# Patient Record
Sex: MALE | Race: WHITE
[De-identification: names, ages, dates, MRNs, and addresses within clinical notes are randomized per-mention and may not be internally consistent; named-entity substitution may affect disease eponyms.]

---

## 2019-06-21 ENCOUNTER — HOSPITAL ENCOUNTER (EMERGENCY)
Dept: HOSPITAL 75 - ER | Age: 24
Discharge: HOME | End: 2019-06-21
Payer: SELF-PAY

## 2019-06-21 VITALS — SYSTOLIC BLOOD PRESSURE: 131 MMHG | DIASTOLIC BLOOD PRESSURE: 92 MMHG

## 2019-06-21 VITALS — BODY MASS INDEX: 24.38 KG/M2 | HEIGHT: 72 IN | WEIGHT: 180 LBS

## 2019-06-21 DIAGNOSIS — F17.210: ICD-10-CM

## 2019-06-21 DIAGNOSIS — S91.212A: Primary | ICD-10-CM

## 2019-06-21 DIAGNOSIS — Z79.52: ICD-10-CM
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
## 2019-06-21 PROCEDURE — 64450 NJX AA&/STRD OTHER PN/BRANCH: CPT

## 2019-06-21 PROCEDURE — 12002 RPR S/N/AX/GEN/TRNK2.6-7.5CM: CPT

## 2019-06-21 NOTE — XMS REPORT
[facility]

                             Created on: 11/10/2017



Jose Alvarez

External Reference #: [id number]

: 1995

Sex: Male



Demographics







                          [place]  [address]

 

                          Preferred Language        Unknown

 

                          Marital Status            Unknown

 

                          Latter-day Affiliation     Unknown

 

                          Race                      Unknown

 

                          Ethnic Group              Unknown





Author







                          Author                    MATTEO GALEAS

 

                          [facility]

 

                          Address                   Unknown

 

                          Phone                     [phone]







Care Team Providers







                    Care Team Member Name    Role                Phone

 

                    MATTEO GALESA     Unavailable         [phone]







PROBLEMS

Unknown Problems



ALLERGIES

No Known Allergies



SOCIAL HISTORY

Never Assessed



PLAN OF CARE







                          Activity                  Details









                                         









                          Follow Up                 prn Reason:prn







VITAL SIGNS







                    Height              71 in               2017-05-10

 

                    Blood pressure systolic    151 mmHg            2017-05-10

 

                    Blood pressure diastolic    93 mmHg             2017-05-10







MEDICATIONS

No Known Medications



RESULTS

No Results



PROCEDURES







                Procedure       Date Ordered    Result          Body Site

 

                LTD ORAL EVALUATION - PROBLEM FOCUS    May 10, 2017                     

 

                INTRAORL-PERIAPICAL 1 FILM 94287    May 10, 2017                     

 

                INTRAORL-PERIAPICAL EA ADD FILM    May 10, 2017                     







IMMUNIZATIONS

No Known Immunizations



MEDICAL (GENERAL) HISTORY







                    Type                Description         Date

 

                    Surgical History    main artery and tendon repair Rt wrist     

 

                    Surgical History    tonsillectomy and adenoidectomy     

 

                    Hospitalization History    surgeries

## 2019-06-21 NOTE — XMS REPORT
Patient Summary (HL7 CCD)

                             Created on: 2016



TALIB METZ

External Reference #: [id number]

: 1995

Sex: Male



Demographics







                          Address                   [address] [place]  [address]

 

                          Home Phone                [phone]

 

                          Preferred Language        English

 

                          Marital Status            Unknown

 

                          Adventism Affiliation     Unknown

 

                          Race                      White

 

                          Ethnic Group              Not  or 





Author







                          JONNY Bennett              Unknown

 

                          Address                   [address]

[place], [place]  [id number]



 

                          Phone                     [phone]







Care Team Providers







                    Care Team Member Name    Role                Phone

 

                    ELIJAH REDD MD    Attphys             [phone]

 

                    ELIJAH REDD MD    Prisurg             [phone]



                                            



Vital Signs

          Unknown or Not Available.                                             
                      



Allergies

          





             Allergy          Code          Allergy Type          Reaction          Status    

 

             No Known Drug Allergies          0            No known drug allergies                       

Active    



                                                                              



Procedures

          Unknown or Not Available.                                             
                                



History of Immunizations

          





                    Immunization          Code                Date    

 

                    MMR                 1997    

 

                    MMR                 2001    

 

                    Hep B, adolescent or pediatric          1996    

 

                    Hep B, adolescent or pediatric          1996    

 

                    Hep B, adolescent or pediatric          08                  1997    

 

                    IPV                 10                  1996    

 

                    IPV                 10                  1996    

 

                    IPV                 10                  1997    

 

                    IPV                 10                  2001    

 

                    influenza, split (incl. purified surface antigen)          15                  2009   

 

 

                    Hib, unspecified formulation          17                  1996    

 

                    DTaP                20                  1996    

 

                    DTaP                20                  1996    

 

                    DTaP                20                  1997    

 

                    DTaP                20                  1997    

 

                    DTaP                20                  2001    

 

                    varicella           21                  1997    

 

                    varicella           21                  2007    

 

                    varicella           21                  2009    

 

                    HPV, quadrivalent          62                  2014    

 

                    Hep A, ped/adol, 2 dose          83                  2007    

 

                    Hep A, ped/adol, 2 dose          83                  2008    

 

                    Hep A, ped/adol, 2 dose          83                  2009    

 

                    Hep A, ped/adol, 2 dose          83                  2011    

 

                    influenza, live, intranasal          111                 2008    

 

                    meningococcal MCV4P          114                 2008    

 

                    meningococcal MCV4P          114                 2009    

 

                    meningococcal MCV4P          114                 2009    

 

                    Tdap                115                 2008    

 

                    Tdap                115                 2009    

 

                    Tdap                115                 2011    

 

                    Influenza, seasonal, injectable, preservative free          140                 2013 

   



                                                                                
                                                                                
                                                                                
                                                                                
                                                                



Problems

          





             Problem          Code          Start Date          Resolved Date          Status    

 

             Lacerated tendon          [id number]          2015                       Active    



                                                                                
       



Results

          Unknown or Not Available.                                             
                                          



Active Medications

          Unknown or Not Available.                                             
                      



Medications Administered During Visit

          Unknown or Not Available.                                             
                                



Encounters

          





                    Encounter Diagnosis          Diagnosis Code          Start Date    

 

                    Abrasion of hand           [id number]           2016    



                                                                    



Social History

          





                Smoking Status          Code            Start Date          End Date    

 

                Current some day smoker          [id number]                               



                                                                    



Patient Decision Aids

          Unknown or Not Available.                                             
            



Discharge Instructions

          







                                                



You were admitted to        [facility] on 2016 02:44 with a principal 
diagnosis of Abrasion of right hand, initial encounter        



You were discharged from        [facility] on 2016 03:19        



Should you have any questions prior to discharge, please contact a member of 
your healthcare team. If you have left the hospital and have any questions, 
please contact your primary care physician.          



                                                          



Chief Complaint and Reason For Visit

          





                          Chief Complaint           Date of Onset    

 

                          WRIST PAIN                     



                                                          



Function Status

          





             Description          Code          Date          Type          Status    

 

             No Impairments          [id number]          10/12/2015          Functional          Active

    



                                                                    



Plan of Care

          





                                        Care Plan Entries    

 

                                        Problem: Lacerated tendon [SNOMED: 711306352]    

 

                                        Goal: Pain control management  [SNOMED: 160189138]    

 

                                        Instructions: take prn pain meds as needed, and prescribed by physician.    







                                                                              



Referral/Transition of Care

          Unknown or Not Available.

## 2019-06-21 NOTE — XMS REPORT
[facility]

                             Created on: 2016



Jose Alvarez

External Reference #: [id number]

: 1995

Sex: Male



Demographics







                          Address                   [place]  [address]

 

                          Home Phone                [phone]

 

                          Preferred Language        Unknown

 

                          Marital Status            Unknown

 

                          Christianity Affiliation     Unknown

 

                          Race                      White

 

                          Ethnic Group              Not  or 





Author







                          Author                    MOIRA FULTON

 

                          Organization              [employer]

 

                          Address                   Unknown

 

                          Phone                     Unavailable







Care Team Providers







                    Care Team Member Name    Role                Phone

 

                    MOIRA FULTON      CP                  Unavailable



                                                                



Allergies

          No Known Allergies                                                    
                                   



Problems

          





             Problem Type    Condition    Code         Onset Dates    Condition Status

 

             Assessment    Groin abscess    L02.214                   Active

 

             Assessment    Rash         R21                       Active

 

             Assessment    Rhinitis, allergic    J30.9                     Active



                                                                                
                           



Medications

          





        Medication    Code System    Code    Instructions    Start Date    End Date    Status    Dosage



 

           Hydrocortisone    NDC        [address]-81    1 % Externally Twice a day    2016    

                                                    1 application to affected area

 

           Claritin    NDC        [address]-92    10 MG Orally Once a day    2016    May 26, 2016

                                                    1 tablet

 

             Clindamycin HCl    NDC          [address]-01    150 MG Orally 4 times a day    2016

                    May 06, 2016                            2 capsules



                                                                                
                           



Procedures

          





                Procedure       Coding System    Code            Date

 

                Office Visit, Est Pt., Level 2    CPT-4           98169           2016



                                                                                
                 



Vital Signs

          





                          Date/Time:                2016

 

                          Cardiac Monitoring Heart Rate    68 bpm

 

                          Weight                    174 lbs

 

                          Height                    71 in

 

                          BMI                       24.27 Index

 

                          Blood Pressure Diastolic    68 mmHg

 

                          Blood Pressure Systolic    110 mmHg



                                                                              



Results

          No Known Results                                                      
             



Summary Purpose

          [employer] Submission

## 2019-06-21 NOTE — XMS REPORT
Patient Summary (HL7 CCD)

                             Created on: 2016



TALIB METZ

External Reference #: [id number]

: 1995

Sex: Male



Demographics







                          Address                   [address] [place]  [address]

 

                          Home Phone                [phone]

 

                          Preferred Language        English

 

                          Marital Status            Unknown

 

                          Alevism Affiliation     Unknown

 

                          Race                      White

 

                          Ethnic Group              Not  or 





Author







                          JONNY Bennett              Unknown

 

                          Address                   [address]

[place], [place]  [address]



 

                          Phone                     [phone]







Care Team Providers







                    Care Team Member Name    Role                Phone

 

                    ELIJAH REDD MD    Attphys             [phone]

 

                    ELIJAH REDD MD    Prisurg             [phone]



                                            



Vital Signs

          Unknown or Not Available.                                             
                      



Allergies

          





             Allergy          Code          Allergy Type          Reaction          Status    

 

             No Known Drug Allergies          0            No known drug allergies                       

Active    



                                                                              



Procedures

          Unknown or Not Available.                                             
                                



History of Immunizations

          





                    Immunization          Code                Date    

 

                    MMR                 1997    

 

                    MMR                 2001    

 

                    Hep B, adolescent or pediatric          1996    

 

                    Hep B, adolescent or pediatric          1996    

 

                    Hep B, adolescent or pediatric          08                  1997    

 

                    IPV                 10                  1996    

 

                    IPV                 10                  1996    

 

                    IPV                 10                  1997    

 

                    IPV                 10                  2001    

 

                    influenza, split (incl. purified surface antigen)          15                  2009   

 

 

                    Hib, unspecified formulation          17                  1996    

 

                    DTaP                20                  1996    

 

                    DTaP                20                  1996    

 

                    DTaP                20                  1997    

 

                    DTaP                20                  1997    

 

                    DTaP                20                  2001    

 

                    varicella           21                  1997    

 

                    varicella           21                  2007    

 

                    varicella           21                  2009    

 

                    HPV, quadrivalent          62                  2014    

 

                    Hep A, ped/adol, 2 dose          83                  2007    

 

                    Hep A, ped/adol, 2 dose          83                  2008    

 

                    Hep A, ped/adol, 2 dose          83                  2009    

 

                    Hep A, ped/adol, 2 dose          83                  2011    

 

                    influenza, live, intranasal          111                 2008    

 

                    meningococcal MCV4P          114                 2008    

 

                    meningococcal MCV4P          114                 2009    

 

                    meningococcal MCV4P          114                 2009    

 

                    Tdap                115                 2008    

 

                    Tdap                115                 2009    

 

                    Tdap                115                 2011    

 

                    Influenza, seasonal, injectable, preservative free          140                 2013 

   



                                                                                
                                                                                
                                                                                
                                                                                
                                                                



Problems

          





             Problem          Code          Start Date          Resolved Date          Status    

 

             Lacerated tendon          [id number]          2015                       Active    



                                                                                
       



Results

          Unknown or Not Available.                                             
                                          



Active Medications

          Unknown or Not Available.                                             
                      



Medications Administered During Visit

          Unknown or Not Available.                                             
                                



Encounters

          





                    Encounter Diagnosis          Diagnosis Code          Start Date    

 

                    Stimulant abuse           984926494           2016    



                                                                    



Social History

          





                Smoking Status          Code            Start Date          End Date    

 

                Current some day smoker          [id number]                               



                                                                    



Patient Decision Aids

          Unknown or Not Available.                                             
            



Discharge Instructions

          







                                                



You were admitted to        [facility] on 2016 03:34 with a principal 
diagnosis of Other stimulant abuse, uncomplicated        



You were discharged from        [facility] on 2016 04:01        



Should you have any questions prior to discharge, please contact a member of 
your healthcare team. If you have left the hospital and have any questions, 
please contact your primary care physician.          



                                                          



Chief Complaint and Reason For Visit

          





                          Chief Complaint           Date of Onset    

 

                          SWALLOWED DRUGS                



                                                          



Function Status

          





             Description          Code          Date          Type          Status    

 

             No Impairments          [id number]          10/12/2015          Functional          Active

    



                                                                    



Referral/Transition of Care

          Unknown or Not Available.

## 2019-06-21 NOTE — ED INTEGUMENTARY GENERAL
General


Chief Complaint:  Skin/Wound Problems


Stated Complaint:  LEFT BIG TOE LAC


Source:  patient, other


Exam Limitations:  no limitations





History of Present Illness


Date Seen by Provider:  Jun 21, 2019


Time Seen by Provider:  01:57


Initial Comments


Patient presents to ER by private conveyance with his significant other chief 

complaint of just prior to arrival he tripped and fell stubbing his toe into a 

mirror which broke and caused a laceration of the end of his great toe of his 

left foot. He wrapped it up and a bandanna. He is up-to-date on his tetanus shot

in the last couple years. Pain is not significant at this time. Does not take 

any medicines nor have any significant medical history.





Allergies and Home Medications


Allergies


Coded Allergies:  


     No Known Drug Allergies (Unverified , 6/24/12)





Home Medications


Albuterol 17 Gm Inh, 1-2 PUFF IH Q4H PRN


   Prescribed by: RAEGAN MI on 6/25/12 0019


Aripiprazole 10 Mg Tab, 1 TAB PO BID, (Reported)


   DAILY 


Loratadine 10 Mg Tablet, 10 MG PO DAILY, (Reported)


Prednisone 20 Mg Tab, 20 MG PO BID


   Prescribed by: RAEGAN MI on 6/25/12 0019


Risperidone 0.5 Mg Tablet, 0.5 MG PO HS, (Reported)





Patient Home Medication List


Home Medication List Reviewed:  Yes





Review of Systems


Review of Systems


Constitutional:  No chills, No fever


EENTM:  No ear discharge, No hearing loss, No blurred vision


Respiratory:  No cough, No phlegm, No short of breath


Cardiovascular:  No chest pain, No edema


Gastrointestinal:  No abdominal pain, No constipation, No nausea





Past Medical-Social-Family Hx


Patient Social History


Smoking Status:  Current Everyday Smoker


Type Used:  Cigarettes


Recent Foreign Travel:  No


Contact w/Someone Who Travel:  No





Physical Exam


Vital Signs


Capillary Refill :


General Appearance:  WD/WN, mild distress


Neck:  full range of motion, normal inspection


Cardiovascular:  normal peripheral pulses, regular rate, rhythm


Respiratory:  no respiratory distress, no accessory muscle use


Neurologic/Psychiatric:  no motor/sensory deficits, alert, normal mood/affect, 

oriented x 3


Skin:  other (3.5 cm linear laceration left foot great toe)





Procedures/Interventions





   Wound Location:  Lower Extremities


Other Wound Location


Left foot great toe anterior distal phalangeal


   Wound Length (cm):  3.5


   Wound's Depth, Shape:  linear, sub Q


   Wound Explored:  no foreign body removed


   Irrigated w/ Saline (ccs):  100


   Betadine Prep?:  Yes (and chlorhexidine sterile saline)


   Anesthesia:  1% Lidocaine


   Volume Anesthetic (ccs):  4


   Wound Debrided:  minimal


   Suture:  Prolene


   Suture Size:  4-0


   Number of Sutures:  6


   Sterile Dressing Applied?:  Yes


Progress


Patient's wound was cleaned with chlorhexidine and sterile saline and then 

soaked in Betadine. We then did a digital block of the great toe left foot in 

the usual fashion using 2 cc of 1% lidocaine without epinephrine bilaterally. 

When his toe was ascertained to be numb we did a very thorough job of cleaning 

out the wound with the chlorhexidine and sterile saline and then soaked again in

Betadine. Using the usual sterile fashion way reapproximated the skin edges 

using 6 simple interrupted sutures made of 4-0 Prolene. The patient tolerated 

the procedure very well. Triple antibiotic ointment was applied over the sutures

and then gauze.





Progress/Results/Core Measures


Results/Orders


My Orders





Orders - PARAS SHORT


Lidocaine 1% Inj 20 Ml (Xylocaine 1% Inj (6/21/19 02:15)








Departure


Impression





   Primary Impression:  


   Laceration of left great toe w/o foreign body w/o damage to nail


   Qualified Codes:  S91.112A - Laceration without foreign body of left great 

   toe without damage to nail, initial encounter


Disposition:  01 HOME, SELF-CARE


Condition:  Improved





Departure-Patient Inst.


Decision time for Depature:  02:35


Referrals:  


NO,LOCAL PHYSICIAN (PCP/Family)


Primary Care Physician


Patient Instructions:  Laceration Repair With Stitches (DC)





Add. Discharge Instructions:  


Keep the wound clean with regular soap and water only. You can apply Vaseline 

over the sutures and then some gauze dressing to be changed every time it 

becomes soiled or at least daily. 


Return to the ER or your primary care doctor in 10 days to have the sutures 

removed. 


Take the Bactrim one tablet twice a day with food for the next 5 days to prevent

infection. 


If you start to have fevers, chills, vomiting or other worrisome symptoms then 

return to the ER. 


If you have increased swelling or pain use Tylenol 1000 mg every 8 hours in 

addition to ibuprofen 800 mg every 8 hours and elevate the foot above the level 

of your heart. 


All discharge instructions reviewed with patient and/or family. Voiced 

understanding.


Scripts


Sulfamethoxazole/Trimethoprim (Bactrim Ds Tablet) 1 Each Tablet


1 EACH PO BID WITH MEALS for 5 Days, #10 TAB 0 Refills


   Prov: PARAS SHORT         6/21/19











PARAS SHORT                 Jun 21, 2019 02:12

## 2019-06-21 NOTE — XMS REPORT
Patient Summary (HL7 CCD)

                             Created on: 2016



TALIB METZ

External Reference #: [id number]

: 1995

Sex: Male



Demographics







                          Address                   [address] [place]  [address]

 

                          Home Phone                [phone]

 

                          Preferred Language        English

 

                          Marital Status            Unknown

 

                          Hinduism Affiliation     Unknown

 

                          Race                      White

 

                          Ethnic Group              Not  or 





Author







                          JONNY Bennett              Unknown

 

                          Address                   [address] [facility] [address] [facility] [address]

[place], [place]  [id number]



 

                          Phone                     [phone]







Care Team Providers







                    Care Team Member Name    Role                Phone

 

                    ELIJAH REDD MD    Attphys             [phone]

 

                    ELIJAH REDD MD    Prisurg             [phone]



                                            



Vital Signs

          Unknown or Not Available.                                             
                      



Allergies

          





             Allergy          Code          Allergy Type          Reaction          Status    

 

             No Known Drug Allergies          0            No known drug allergies                       

Active    



                                                                              



Procedures

          Unknown or Not Available.                                             
                                



History of Immunizations

          





                    Immunization          Code                Date    

 

                    MMR                 1997    

 

                    MMR                 2001    

 

                    Hep B, adolescent or pediatric          1996    

 

                    Hep B, adolescent or pediatric          1996    

 

                    Hep B, adolescent or pediatric          08                  1997    

 

                    IPV                 10                  1996    

 

                    IPV                 10                  1996    

 

                    IPV                 10                  1997    

 

                    IPV                 10                  2001    

 

                    influenza, split (incl. purified surface antigen)          15                  2009   

 

 

                    Hib, unspecified formulation          17                  1996    

 

                    DTaP                20                  1996    

 

                    DTaP                20                  1996    

 

                    DTaP                20                  1997    

 

                    DTaP                20                  1997    

 

                    DTaP                20                  2001    

 

                    varicella           21                  1997    

 

                    varicella           21                  2007    

 

                    varicella           21                  2009    

 

                    HPV, quadrivalent          62                  2014    

 

                    Hep A, ped/adol, 2 dose          83                  2007    

 

                    Hep A, ped/adol, 2 dose          83                  2008    

 

                    Hep A, ped/adol, 2 dose          83                  2009    

 

                    Hep A, ped/adol, 2 dose          83                  2011    

 

                    influenza, live, intranasal          111                 2008    

 

                    meningococcal MCV4P          114                 2008    

 

                    meningococcal MCV4P          114                 2009    

 

                    meningococcal MCV4P          114                 2009    

 

                    Tdap                115                 2008    

 

                    Tdap                115                 2009    

 

                    Tdap                115                 2011    

 

                    Influenza, seasonal, injectable, preservative free          140                 2013 

   



                                                                                
                                                                                
                                                                                
                                                                                
                                                                



Problems

          





             Problem          Code          Start Date          Resolved Date          Status    

 

             Lacerated tendon          [id number]          2015                       Active    



                                                                                
       



Results

          Unknown or Not Available.                                             
                                          



Active Medications

          Unknown or Not Available.                                             
                      



Medications Administered During Visit

          Unknown or Not Available.                                             
                                



Encounters

          





                    Encounter Diagnosis          Diagnosis Code          Start Date    

 

                    Stimulant abuse           [id number]           2016    



                                                                    



Social History

          





                Smoking Status          Code            Start Date          End Date    

 

                Current some day smoker          [id number]                               



                                                                    



Patient Decision Aids

          Unknown or Not Available.                                             
            



Discharge Instructions

          







                                                



You were admitted to        [facility] on 2016 03:34 with a principal 
diagnosis of Other stimulant abuse, uncomplicated        



You were discharged from        [facility] on 2016 04:01        



Should you have any questions prior to discharge, please contact a member of 
your healthcare team. If you have left the hospital and have any questions, 
please contact your primary care physician.          



                                                          



Chief Complaint and Reason For Visit

          





                          Chief Complaint           Date of Onset    

 

                          SWALLOWED DRUGS                



                                                          



Function Status

          





             Description          Code          Date          Type          Status    

 

             No Impairments          [id number]          10/12/2015          Functional          Active

    



                                                                    



Referral/Transition of Care

          Unknown or Not Available.